# Patient Record
Sex: FEMALE | Race: WHITE | Employment: UNEMPLOYED | URBAN - METROPOLITAN AREA
[De-identification: names, ages, dates, MRNs, and addresses within clinical notes are randomized per-mention and may not be internally consistent; named-entity substitution may affect disease eponyms.]

---

## 2019-05-06 ENCOUNTER — APPOINTMENT (OUTPATIENT)
Dept: GENERAL RADIOLOGY | Age: 49
End: 2019-05-06

## 2019-05-06 ENCOUNTER — HOSPITAL ENCOUNTER (EMERGENCY)
Age: 49
Discharge: HOME | End: 2019-05-06

## 2019-05-06 VITALS
HEIGHT: 65 IN | DIASTOLIC BLOOD PRESSURE: 74 MMHG | HEART RATE: 114 BPM | OXYGEN SATURATION: 99 % | BODY MASS INDEX: 24.68 KG/M2 | WEIGHT: 148.15 LBS | RESPIRATION RATE: 16 BRPM | TEMPERATURE: 98.5 F | SYSTOLIC BLOOD PRESSURE: 141 MMHG

## 2019-05-06 DIAGNOSIS — M26.609 TEMPOROMANDIBULAR JOINT DISORDER (TMJ): ICD-10-CM

## 2019-05-06 DIAGNOSIS — S93.602A FOOT SPRAIN, LEFT, INITIAL ENCOUNTER: ICD-10-CM
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
## 2019-05-06 PROCEDURE — 70330 X-RAY EXAM OF JAW JOINTS: CPT

## 2019-05-06 PROCEDURE — 73630 X-RAY EXAM OF FOOT: CPT

## 2019-05-06 PROCEDURE — 99283 EMERGENCY DEPT VISIT LOW MDM: CPT

## 2019-05-06 RX ORDER — NAPROXEN 500 MG/1
500 TABLET ORAL 2 TIMES DAILY PRN
Qty: 20 TABLET | Refills: 0 | Status: SHIPPED | OUTPATIENT
Start: 2019-05-06

## 2019-05-06 SDOH — HEALTH STABILITY: MENTAL HEALTH: HOW OFTEN DO YOU HAVE A DRINK CONTAINING ALCOHOL?: NEVER

## 2019-05-06 ASSESSMENT — PAIN SCALES - GENERAL
PAINLEVEL_OUTOF10: 0
PAINLEVEL_OUTOF10: 0

## 2019-05-07 NOTE — ED PROVIDER NOTES
[facility] [address]  eMERGENCY dEPARTMENT eNCOUnter      Pt Name: Tamie Martin  MRN: [id number]  Rebekagfjuan j 1970  Date of evaluation: 5/6/2019  Provider: LELO Rondon    Evaluated by Advanced Practice Provider with Attending Physician available for consultation. CHIEF COMPLAINT       Chief Complaint   Patient presents with    Foot Injury     left foot pain after stepping down off of a ladder from the fourth rung when she missed the wall. HISTORY OF PRESENT ILLNESS  (Location/Symptom, Timing/Onset, Context/Setting, Quality, Duration, Modifying Factors, Severity.)   Tamie Martin is a 53 y.o. female who presents to the emergency department with the complaint of left foot pain following a fall from a ladder yesterday. Patient says she is working outside her house when she slipped and the foot seemed to be bent out of shape, and now it has some bruising on the top of it, and it is quite painful to walk on. She says the pain is mostly in the lateral aspect of the foot, and she denies any significant swelling. Denies prior history of significant injury, fracture or surgery to the affected foot. Denies ankle pain. She says she has some bruising elsewhere but not pain as bad as his in the foot. Patient also complains of pain TMJ's bilaterally, and has been going on for many months, perhaps years. Patient says she does not have family practice care right now because she doesn't have insurance. She says there are headaches associated with the pain, and the pain is somewhat greater on the left than the right. She says she seems to be chewing and eating normally, but sometimes worsens the pain. Denies any traumatic injury to head. Denies swelling. No other complaints. Nursing Notes were reviewed and I agree. REVIEW OF SYSTEMS    (2-9 systems for level 4, 10 or more for level 5)     Constitutional:  Negative for fever, chills.    HEENT: Positive for TMJ pain bilaterally. Respiratory:  Negative for cough, shortness of breath. Cardiovascular:  Negative for chest pain, palpitations. Gastrointestinal:  Negative for nausea, vomiting, abdominal pain. Genitourinary:  Negative for dysuria, hematuria, flank pain, and pelvic pain. Musculoskeletal:  Positive for left foot pain and bruising. Negative for myalgias, neck pain and neck stiffness. Neurological:  Positive for frequent headaches. Negative for dizziness, weakness, light-headedness, numbness. Except as noted above the remainder of the review of systems was reviewed and negative. PAST MEDICAL HISTORY   History reviewed. No pertinent past medical history. SURGICAL HISTORY     History reviewed. No pertinent surgical history. CURRENT MEDICATIONS       Previous Medications    No medications on file       ALLERGIES     Patient has no known allergies. FAMILY HISTORY     History reviewed. No pertinent family history. No family status information on file. SOCIAL HISTORY      reports that she has been smoking cigarettes. She has been smoking about 0.50 packs per day. She has never used smokeless tobacco. She reports that she does not drink alcohol or use drugs. PHYSICAL EXAM    (up to 7 for level 4, 8 or more for level 5)     ED Triage Vitals [05/06/19 2035]   BP Temp Temp src Pulse Resp SpO2 Height Weight   (!) 141/74 98.5 °F (36.9 °C) -- 114 16 99 % 5' 5\" (1.651 m) 148 lb 2.4 oz (67.2 kg)       Constitutional:  Appearing well-developed and well-nourished. No distress. HENT:  Normocephalic and atraumatic. Mild tenderness to palpation over the TMJs bilaterally, negative for swelling, bruising. Normal dental occlusion observed, with good range of motion to the TMJs. Normal intraoral exam.  Cardiovascular:  Normal rate, regular rhythm, normal heart sounds and intact distal pulses. Pulmonary/Chest:  Effort normal and breath sounds normal. No respiratory distress.    Musculoskeletal: Positive for mild ecchymosis noted to the dorsal left midfoot, with tenderness to palpation along the lateral aspect. Normal examination of the left ankle, with good range of motion. 2+ dorsalis pedis pulse on the left. No edema exhibited. Sensation to light touch grossly intact and capillary refill <3 seconds in the digits of the left lower extremity. Neurological:  Oriented to person, place, and time. No cranial nerve deficit. Skin:  Skin is warm and dry. Not diaphoretic. Psychiatric:  Normal mood, affect, behavior, judgment and thought content. DIAGNOSTIC RESULTS     RADIOLOGY:     Interpretation per the Radiologist below, if available at the time of this note:    XR TMJ BILATERAL   Final Result   No definite fracture or malalignment. XR FOOT LEFT (MIN 3 VIEWS)   Final Result   No fracture or malalignment. LABS:  Labs Reviewed - No data to display    All other labs were within normal range or not returned as of this dictation. EMERGENCY DEPARTMENT COURSE and DIFFERENTIAL DIAGNOSIS/MDM:   Vitals:    Vitals:    05/06/19 2035   BP: (!) 141/74   Pulse: 114   Resp: 16   Temp: 98.5 °F (36.9 °C)   SpO2: 99%   Weight: 148 lb 2.4 oz (67.2 kg)   Height: 5' 5\" (1.651 m)       The patient's condition in the ED was good, the patient was afebrile and nontoxic in appearance, and the patient's physical exam was unremarkable other than for the findings noted above. Good neurovascular status in all extremities. X-rays of the foot and the TMJs showed no acute abnormalities. Patient was able to ambulate in the ED and she was given a postop shoe. Suffered a likely foot sprain, and her exam and history strongly suggest TMJ disorder. She is in need of family practice care but doesn't presently have insurance. There was no indication for hospitalization or further workup.   Patient will be discharged with a prescription for anti-inflammatory medication and given a referral for family practice care to be used as soon as possible. The patient verbalized understanding and agreement with this plan of care. The patient was advised to return to the emergency department if symptoms should significantly worsen or if new and concerning symptoms should appear. I estimate there is LOW risk for FRACTURE, COMPARTMENT SYNDROME, DEEP VENOUS THROMBOSIS, SEPTIC ARTHRITIS, TENDON OR NEUROVASCULAR INJURY, SUBARACHNOID HEMORRHAGE, MENINGITIS, INTRACRANIAL HEMORRHAGE, SUBDURAL OR EPIDURAL HEMATOMA, OR STROKE, thus I consider the discharge disposition reasonable. PROCEDURES:  None    FINAL IMPRESSION      1. Fall from ladder, initial encounter    2. Foot sprain, left, initial encounter    3.  Temporomandibular joint disorder (TMJ)          DISPOSITION/PLAN   DISPOSITION Decision To Discharge 05/06/2019 10:02:47 PM      PATIENT REFERRED TO:  Ora Schroeder  [phone]  Call   to get a family doctor      DISCHARGE MEDICATIONS:  New Prescriptions    NAPROXEN (NAPROSYN) 500 MG TABLET    Take 1 tablet by mouth 2 times daily as needed for Pain       (Please note that portions of this note were completed with a voice recognition program.  Efforts were made to edit the dictations but occasionally words are mis-transcribed.)    Kyle Corrales, [id number] [place] [address]  05/06/19 1725

## 2019-05-07 NOTE — ED NOTES
Dc instructions completed with pt. Pt verbalized understanding. rx x1. Pt was ambulatory to exit.       Joya Bustillos RN  05/06/19 9446

## 2024-06-17 ENCOUNTER — HOSPITAL ENCOUNTER (EMERGENCY)
Age: 54
Discharge: HOME | End: 2024-06-17
Attending: EMERGENCY MEDICINE
Payer: COMMERCIAL

## 2024-06-17 ENCOUNTER — APPOINTMENT (OUTPATIENT)
Dept: GENERAL RADIOLOGY | Age: 54
End: 2024-06-17
Payer: COMMERCIAL

## 2024-06-17 VITALS
DIASTOLIC BLOOD PRESSURE: 81 MMHG | SYSTOLIC BLOOD PRESSURE: 123 MMHG | WEIGHT: 160.94 LBS | RESPIRATION RATE: 12 BRPM | BODY MASS INDEX: 25.86 KG/M2 | OXYGEN SATURATION: 95 % | TEMPERATURE: 97.7 F | HEART RATE: 85 BPM | HEIGHT: 66 IN

## 2024-06-17 DIAGNOSIS — R07.9 CHEST PAIN, UNSPECIFIED TYPE: Primary | ICD-10-CM

## 2024-06-17 DIAGNOSIS — F17.200 CURRENT SMOKER: ICD-10-CM

## 2024-06-17 DIAGNOSIS — F10.10 ALCOHOL ABUSE: ICD-10-CM

## 2024-06-17 LAB
ALBUMIN SERPL-MCNC: 4.9 G/DL (ref 3.4–5)
ALBUMIN/GLOB SERPL: 1.3 {RATIO} (ref 1.1–2.2)
ALP SERPL-CCNC: 147 U/L (ref 40–129)
ALT SERPL-CCNC: 92 U/L (ref 10–40)
ANION GAP SERPL CALCULATED.3IONS-SCNC: 17 MMOL/L (ref 3–16)
AST SERPL-CCNC: 74 U/L (ref 15–37)
BASOPHILS # BLD: 0 K/UL (ref 0–0.2)
BASOPHILS NFR BLD: 0.3 %
BILIRUB SERPL-MCNC: 0.3 MG/DL (ref 0–1)
BUN SERPL-MCNC: 9 MG/DL (ref 7–20)
CALCIUM SERPL-MCNC: 9.3 MG/DL (ref 8.3–10.6)
CHLORIDE SERPL-SCNC: 100 MMOL/L (ref 99–110)
CO2 SERPL-SCNC: 21 MMOL/L (ref 21–32)
CREAT SERPL-MCNC: <0.5 MG/DL (ref 0.6–1.1)
DEPRECATED RDW RBC AUTO: 14.2 % (ref 12.4–15.4)
EOSINOPHIL # BLD: 0.2 K/UL (ref 0–0.6)
EOSINOPHIL NFR BLD: 1.6 %
GFR SERPLBLD CREATININE-BSD FMLA CKD-EPI: >90 ML/MIN/{1.73_M2}
GLUCOSE SERPL-MCNC: 111 MG/DL (ref 70–99)
HCT VFR BLD AUTO: 43.7 % (ref 36–48)
HGB BLD-MCNC: 15.4 G/DL (ref 12–16)
LYMPHOCYTES # BLD: 2.9 K/UL (ref 1–5.1)
LYMPHOCYTES NFR BLD: 28.9 %
MCH RBC QN AUTO: 36.5 PG (ref 26–34)
MCHC RBC AUTO-ENTMCNC: 35.2 G/DL (ref 31–36)
MCV RBC AUTO: 103.7 FL (ref 80–100)
MONOCYTES # BLD: 0.9 K/UL (ref 0–1.3)
MONOCYTES NFR BLD: 9.3 %
NEUTROPHILS # BLD: 6 K/UL (ref 1.7–7.7)
NEUTROPHILS NFR BLD: 59.9 %
PLATELET # BLD AUTO: 277 K/UL (ref 135–450)
PMV BLD AUTO: 8.1 FL (ref 5–10.5)
POTASSIUM SERPL-SCNC: 3.7 MMOL/L (ref 3.5–5.1)
PROT SERPL-MCNC: 8.6 G/DL (ref 6.4–8.2)
RBC # BLD AUTO: 4.21 M/UL (ref 4–5.2)
SODIUM SERPL-SCNC: 138 MMOL/L (ref 136–145)
TROPONIN, HIGH SENSITIVITY: <6 NG/L (ref 0–14)
WBC # BLD AUTO: 10 K/UL (ref 4–11)

## 2024-06-17 PROCEDURE — 99285 EMERGENCY DEPT VISIT HI MDM: CPT

## 2024-06-17 PROCEDURE — 71046 X-RAY EXAM CHEST 2 VIEWS: CPT

## 2024-06-17 PROCEDURE — 85025 COMPLETE CBC W/AUTO DIFF WBC: CPT

## 2024-06-17 PROCEDURE — 84484 ASSAY OF TROPONIN QUANT: CPT

## 2024-06-17 PROCEDURE — 93005 ELECTROCARDIOGRAM TRACING: CPT | Performed by: EMERGENCY MEDICINE

## 2024-06-17 PROCEDURE — 80053 COMPREHEN METABOLIC PANEL: CPT

## 2024-06-17 ASSESSMENT — PAIN SCALES - GENERAL: PAINLEVEL_OUTOF10: 5

## 2024-06-17 ASSESSMENT — PAIN DESCRIPTION - ONSET: ONSET: PROGRESSIVE

## 2024-06-17 ASSESSMENT — PAIN - FUNCTIONAL ASSESSMENT: PAIN_FUNCTIONAL_ASSESSMENT: ACTIVITIES ARE NOT PREVENTED

## 2024-06-17 ASSESSMENT — PAIN DESCRIPTION - FREQUENCY: FREQUENCY: CONTINUOUS

## 2024-06-17 ASSESSMENT — PAIN DESCRIPTION - DESCRIPTORS: DESCRIPTORS: DISCOMFORT

## 2024-06-17 ASSESSMENT — PAIN DESCRIPTION - LOCATION: LOCATION: CHEST

## 2024-06-17 ASSESSMENT — PAIN DESCRIPTION - ORIENTATION: ORIENTATION: MID

## 2024-06-17 ASSESSMENT — PAIN DESCRIPTION - PAIN TYPE: TYPE: ACUTE PAIN

## 2024-06-17 NOTE — ED TRIAGE NOTES
Patient to the ER with complaints of mid chest pain that started last night.  She says the pain gets worse when she takes a deep breath.  Denies any known cardiac hx.     She does report that her left shoulder has been bothering her for about a month with no injury.  She thinks it could be cardiac related.     Alert and oriented x4 and ambulatory at time of triage.

## 2024-06-18 LAB
EKG ATRIAL RATE: 105 BPM
EKG DIAGNOSIS: NORMAL
EKG P AXIS: 70 DEGREES
EKG P-R INTERVAL: 144 MS
EKG Q-T INTERVAL: 348 MS
EKG QRS DURATION: 82 MS
EKG QTC CALCULATION (BAZETT): 459 MS
EKG R AXIS: 68 DEGREES
EKG T AXIS: 48 DEGREES
EKG VENTRICULAR RATE: 105 BPM

## 2024-06-18 PROCEDURE — 93010 ELECTROCARDIOGRAM REPORT: CPT | Performed by: INTERNAL MEDICINE

## 2024-06-18 NOTE — DISCHARGE INSTRUCTIONS
The blood tests, EKG, and chest x-ray we did today were reassuring with no signs of an acute infection, heart attack, heart arrhythmia, pneumonia.  However we did discuss the possibility of a blood clot and recommendation for further blood test/CT scan.  You have been waiting for a long time and need to get home, we discussed the risks of going home without completion of evaluation and strict return precautions.      We also talked about following up with your primary care who can order outpatient testing for you.  You can also discuss further outpatient testing for your heart, this may be necessary even though you did have a stress test within the last year.    We also talked about your liver enzymes being elevated likely a result of your alcohol drinking.  More than 3 drinks for a woman in a sitting is considered binge drinking/abuse of alcohol.  We also talked about quitting smoking, when you are ready to do so your primary care can help you.  We do want you to keep following up with your therapist at the [facility].    Return to ED for any new or worsening symptoms or concerns.

## 2024-06-18 NOTE — ED PROVIDER NOTES
Aside from what is stated above denies any other symptoms or modifying factors.    Nursing Notes were all reviewed and agreed with or any disagreements addressed in HPI/MDM.  REVIEW OF SYSTEMS :    Review of Systems Pertinent positive and negative findings as documented in the HPI  PAST MEDICAL HISTORY   No past medical history on file.    SURGICALHISTORY     No past surgical history on file.  CURRENT MEDICATIONS       Previous Medications    No medications on file      ALLERGIES     Penicillins  FAMILY HISTORY     No family history on file.  SOCIAL HISTORY       Social History     Socioeconomic History    Marital status: Single   Tobacco Use    Smoking status: Every Day     Current packs/day: 0.50     Types: Cigarettes    Smokeless tobacco: Never   Substance and Sexual Activity    Alcohol use: Never    Drug use: Never     SCREENINGS        Thony Coma Scale  Eye Opening: Spontaneous  Best Verbal Response: Oriented  Best Motor Response: Obeys commands  Thony Coma Scale Score: 15                  CIWA Assessment  BP: (!) 141/85  Pulse: 92         PHYSICAL EXAM  1 or more Elements   INITIAL VITALS: BP: (!) 141/85, Temp: 97.7 °F (36.5 °C), Pulse: (!) 109, Respirations: 18, SpO2: 96 % Height: 166.4 cm (5' 5.5\")   Wt Readings from Last 3 Encounters:   06/17/24 73 kg (160 lb 15 oz)   05/06/19 67.2 kg (148 lb 2.4 oz)     Physical Exam  Vitals and nursing note reviewed.   Constitutional:       General: She is not in acute distress.     Appearance: She is not ill-appearing, toxic-appearing or diaphoretic.   HENT:      Head: Normocephalic and atraumatic.      Right Ear: External ear normal.      Left Ear: External ear normal.      Nose: Nose normal.   Eyes:      General: No scleral icterus.        Right eye: No discharge.         Left eye: No discharge.      Conjunctiva/sclera: Conjunctivae normal.   Neck:      Trachea: No tracheal deviation.   Cardiovascular:      Rate and Rhythm: Normal rate.      Pulses: Normal pulses.

## 2024-06-18 NOTE — ED NOTES
Provider order placed for patient's discharge. Provider reviewed decision to discharge with the patient. Discharge paperwork and any prescriptions were reviewed with the patient. Patient verbalized understanding of discharge education and any prescriptions and has no further questions or further needs at this time. Patient left with all personal belongings and was stable upon departure. Patient thanked for choosing [facility] and informed to return should any need arise.

## 2024-11-12 ENCOUNTER — TELEPHONE (OUTPATIENT)
Dept: ORTHOPEDIC SURGERY | Age: 54
End: 2024-11-12

## 2024-11-12 ENCOUNTER — OFFICE VISIT (OUTPATIENT)
Dept: ORTHOPEDIC SURGERY | Age: 54
End: 2024-11-12
Payer: COMMERCIAL

## 2024-11-12 VITALS — HEIGHT: 65 IN | WEIGHT: 174 LBS | BODY MASS INDEX: 28.99 KG/M2

## 2024-11-12 DIAGNOSIS — M50.30 DDD (DEGENERATIVE DISC DISEASE), CERVICAL: Primary | ICD-10-CM

## 2024-11-12 DIAGNOSIS — G89.29 CHRONIC LEFT SHOULDER PAIN: ICD-10-CM

## 2024-11-12 DIAGNOSIS — M79.602 ARM PAIN, LEFT: ICD-10-CM

## 2024-11-12 DIAGNOSIS — M54.12 CERVICAL RADICULOPATHY: ICD-10-CM

## 2024-11-12 DIAGNOSIS — M25.512 CHRONIC LEFT SHOULDER PAIN: ICD-10-CM

## 2024-11-12 PROCEDURE — 99204 OFFICE O/P NEW MOD 45 MIN: CPT | Performed by: ORTHOPAEDIC SURGERY

## 2024-11-12 PROCEDURE — G8427 DOCREV CUR MEDS BY ELIG CLIN: HCPCS | Performed by: ORTHOPAEDIC SURGERY

## 2024-11-12 PROCEDURE — 3017F COLORECTAL CA SCREEN DOC REV: CPT | Performed by: ORTHOPAEDIC SURGERY

## 2024-11-12 PROCEDURE — 4004F PT TOBACCO SCREEN RCVD TLK: CPT | Performed by: ORTHOPAEDIC SURGERY

## 2024-11-12 PROCEDURE — G8484 FLU IMMUNIZE NO ADMIN: HCPCS | Performed by: ORTHOPAEDIC SURGERY

## 2024-11-12 PROCEDURE — G8419 CALC BMI OUT NRM PARAM NOF/U: HCPCS | Performed by: ORTHOPAEDIC SURGERY

## 2024-11-12 RX ORDER — CYCLOBENZAPRINE HCL 10 MG
10 TABLET ORAL NIGHTLY PRN
Qty: 10 TABLET | Refills: 0 | Status: SHIPPED | OUTPATIENT
Start: 2024-11-12 | End: 2024-11-22

## 2024-11-12 RX ORDER — METHYLPREDNISOLONE 4 MG/1
TABLET ORAL
Qty: 1 KIT | Refills: 0 | Status: SHIPPED | OUTPATIENT
Start: 2024-11-12 | End: 2024-11-18

## 2024-11-12 RX ORDER — AMLODIPINE BESYLATE 5 MG/1
10 TABLET ORAL DAILY
COMMUNITY
Start: 2024-01-15

## 2024-11-12 RX ORDER — IBUPROFEN 200 MG
600 TABLET ORAL EVERY 6 HOURS PRN
COMMUNITY

## 2024-11-12 RX ORDER — ROSUVASTATIN CALCIUM 10 MG/1
10 TABLET, COATED ORAL DAILY
COMMUNITY
Start: 2024-10-29

## 2024-11-12 SDOH — HEALTH STABILITY: PHYSICAL HEALTH: ON AVERAGE, HOW MANY DAYS PER WEEK DO YOU ENGAGE IN MODERATE TO STRENUOUS EXERCISE (LIKE A BRISK WALK)?: 3 DAYS

## 2024-11-12 NOTE — PROGRESS NOTES
Marleni Perez  [id number]  November 12, 2024    Chief Complaint   Patient presents with    Shoulder Pain     Left       History: The patient is a 54-year-old female who is here for evaluation of her left shoulder.  The patient does report injuring her left shoulder at the age of 26.  She was told she had a rotator cuff tear and this recovered without surgery.  The patient has had some radiating left shoulder pain and posterior left shoulder pain for the past 2 weeks.  She occasionally has some neck pain.  She is right-hand dominant.  She has seen a chiropractor in the past for manipulations.    The patient's  past medical history, medications, allergies,  family history, social history, and have been reviewed, and dated and are recorded in the chart.  Pertinent items are noted in HPI.  Review of systems reviewed from Pertinent History Form dated on 11/12 and available in the patient's chart under the Media tab.     Vitals:  Ht 1.651 m (5' 5\")   Wt 78.9 kg (174 lb)   BMI 28.96 kg/m²     Physical: On examination today, the patient is alert and oriented x 3.  She essentially has full range of motion of the left shoulder.  She has mild subacromial tenderness.  She has diffuse periscapular tenderness and tenderness over the medial border of the scapula.  She has diffuse pericervical muscle tightness and tenderness.  Drop arm test is negative.  Liftoff sign is negative.  Apprehension sign is negative.  Speed's Test is negative.    X-rays: 4 views of the left shoulder obtained in the office today were extensively reviewed.  There is a subtle hooking to the acromion.  There is mild AC joint arthritis.  2 views of the cervical spine obtained in the office today were extensively reviewed.  There is rather significant degenerative disc disease from C3-C7.  There is reversal of the cervical lordosis.    Impression: Cervical degenerative disc disease with spondylosis #2 cervical radiculopathy    Plan: At this time, the